# Patient Record
Sex: MALE | Race: BLACK OR AFRICAN AMERICAN | NOT HISPANIC OR LATINO | URBAN - METROPOLITAN AREA
[De-identification: names, ages, dates, MRNs, and addresses within clinical notes are randomized per-mention and may not be internally consistent; named-entity substitution may affect disease eponyms.]

---

## 2018-01-22 ENCOUNTER — EMERGENCY (EMERGENCY)
Facility: HOSPITAL | Age: 48
LOS: 1 days | Discharge: HOME | End: 2018-01-22
Admitting: EMERGENCY MEDICINE
Payer: COMMERCIAL

## 2018-01-22 VITALS
TEMPERATURE: 98 F | OXYGEN SATURATION: 98 % | HEART RATE: 79 BPM | DIASTOLIC BLOOD PRESSURE: 81 MMHG | RESPIRATION RATE: 16 BRPM | SYSTOLIC BLOOD PRESSURE: 138 MMHG
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
## 2018-01-22 DIAGNOSIS — F17.200 NICOTINE DEPENDENCE, UNSPECIFIED, UNCOMPLICATED: ICD-10-CM
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
## 2018-01-22 DIAGNOSIS — S20.211A CONTUSION OF RIGHT FRONT WALL OF THORAX, INITIAL ENCOUNTER: ICD-10-CM
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
## 2018-01-22 DIAGNOSIS — R07.89 OTHER CHEST PAIN: ICD-10-CM

## 2018-01-22 PROCEDURE — 71250 CT THORAX DX C-: CPT | Mod: 26

## 2018-01-22 PROCEDURE — 73000 X-RAY EXAM OF COLLAR BONE: CPT | Mod: 26,RT

## 2018-01-22 PROCEDURE — 99285 EMERGENCY DEPT VISIT HI MDM: CPT | Mod: 25

## 2018-01-22 PROCEDURE — 71046 X-RAY EXAM CHEST 2 VIEWS: CPT | Mod: 26

## 2018-01-22 PROCEDURE — 93010 ELECTROCARDIOGRAM REPORT: CPT | Mod: NC

## 2018-01-22 PROCEDURE — 71046 X-RAY EXAM CHEST 2 VIEWS: CPT | Mod: NC

## 2018-01-22 RX ORDER — TRAMADOL HYDROCHLORIDE 50 MG/1
50 TABLET ORAL ONCE
Qty: 0 | Refills: 0 | Status: DISCONTINUED | OUTPATIENT
Start: 2018-01-22 | End: 2018-01-22

## 2018-01-22 RX ORDER — IBUPROFEN 200 MG
600 TABLET ORAL ONCE
Qty: 0 | Refills: 0 | Status: COMPLETED | OUTPATIENT
Start: 2018-01-22 | End: 2018-01-22

## 2018-01-22 RX ORDER — IBUPROFEN 200 MG
1 TABLET ORAL
Qty: 20 | Refills: 0 | OUTPATIENT
Start: 2018-01-22 | End: 2018-02-20

## 2018-01-22 RX ADMIN — Medication 600 MILLIGRAM(S): at 12:54

## 2018-01-22 RX ADMIN — TRAMADOL HYDROCHLORIDE 50 MILLIGRAM(S): 50 TABLET ORAL at 12:54

## 2018-01-22 NOTE — ED PROVIDER NOTE - DIAGNOSTIC INTERPRETATION
Xray (wet reads) interpreted by CHUY CHACKO   Xray chest/clavicle - Cardiac silhouette, aortic knob, mediastinal and hilar contours appear wnl, no acute consolidation, infiltrate, effusion, or PTX. No bony abnormalities noted. Xray (wet reads) interpreted by CHUY CHACKO   Xray chest/clavicle - Cardiac silhouette, aortic knob, mediastinal and hilar contours appear wnl, no acute consolidation, infiltrate, effusion, or PTX, ?T2-3 R sided fx, no subluxation noted

## 2018-01-22 NOTE — ED ADULT TRIAGE NOTE - CHIEF COMPLAINT QUOTE
states a chest metal pipe fell on chest at work today states a metal pipe fell on chest at work today, red area noted upper middle chest

## 2018-01-22 NOTE — ED ADULT NURSE NOTE - OBJECTIVE STATEMENT
c/p chest "soreness" s/p metal pipe falling on him at work, denies sob, redness noted, no s/s of distress, awaiting MD barcenas, will continue to monitor

## 2018-01-22 NOTE — ED PROVIDER NOTE - PHYSICAL EXAMINATION
Gen - WDWN M, NAD, comfortable in stretcher, non-toxic appearing, speaking in full sentences   Skin - warm, dry, intact  HEENT - AT/NC, PERRL, EOMI, no conjunctival injection, moist oral mucosa, o/p clear with no erythema, edema, or exudate, uvula midline, airway patent, neck supple and NT, FROM  CV - S1S2, R/R/R  Resp - respiration non-labored, CTAB, symmetric bs b/l, no r/r/w  GI - NABS, soft, ND, NT, no rebound or guarding, no CVAT b/l   MS - w/w/p, R anterior chest wall (T2-3 region) and proximal clavicle TTP with mild edema, no ecchymosis, crepitus or deformity   Neuro - AxOx3, no focal neuro deficits, ambulatory without gait disturbance

## 2018-01-22 NOTE — ED PROVIDER NOTE - OBJECTIVE STATEMENT
47 yo M with no known PMHx, presenting c/o R clavicle and chest wall pain s/p work injury. 49 yo M with no known PMHx, presenting c/o R clavicle and chest wall pain s/p work injury. Pt states that a metal pipe fell on his chest wall at work from approximately 10 ft height and sustained injury to the region with pain and swelling subsequently.  Denies head trauma, LOC, bleeding, HA, dizziness, SOB, CP, palpitations, N/V, change in ROM/sensation, abdominal/back/neck pain, focal weakness, change in vision/mental status/speech, paresthesia, and malaise. Not on any AC/NSAIDs

## 2018-07-26 ENCOUNTER — APPOINTMENT (OUTPATIENT)
Dept: UROLOGY | Facility: CLINIC | Age: 48
End: 2018-07-26
Payer: COMMERCIAL

## 2018-07-26 PROBLEM — Z00.00 ENCOUNTER FOR PREVENTIVE HEALTH EXAMINATION: Status: ACTIVE | Noted: 2018-07-26

## 2018-07-26 PROCEDURE — 99204 OFFICE O/P NEW MOD 45 MIN: CPT

## 2018-07-30 LAB
AFP-TM SERPL-MCNC: 4.8 NG/ML
HCG-TM SERPL-MCNC: <1 MIU/ML
LDH SERPL-CCNC: 189 U/L
LH SERPL-ACNC: <0.1
TESTOST SERPL-MCNC: 296.1 NG/DL

## 2018-08-01 ENCOUNTER — APPOINTMENT (OUTPATIENT)
Dept: UROLOGY | Facility: CLINIC | Age: 48
End: 2018-08-01
Payer: COMMERCIAL

## 2018-08-01 DIAGNOSIS — Z78.9 OTHER SPECIFIED HEALTH STATUS: ICD-10-CM

## 2018-08-01 DIAGNOSIS — Z80.0 FAMILY HISTORY OF MALIGNANT NEOPLASM OF DIGESTIVE ORGANS: ICD-10-CM

## 2018-08-01 DIAGNOSIS — Z80.43 FAMILY HISTORY OF MALIGNANT NEOPLASM OF TESTIS: ICD-10-CM

## 2018-08-01 PROCEDURE — 99214 OFFICE O/P EST MOD 30 MIN: CPT

## 2018-08-11 ENCOUNTER — FORM ENCOUNTER (OUTPATIENT)
Age: 48
End: 2018-08-11

## 2018-08-12 ENCOUNTER — OUTPATIENT (OUTPATIENT)
Dept: OUTPATIENT SERVICES | Facility: HOSPITAL | Age: 48
LOS: 1 days | End: 2018-08-12
Payer: COMMERCIAL

## 2018-08-12 ENCOUNTER — APPOINTMENT (OUTPATIENT)
Dept: CT IMAGING | Facility: IMAGING CENTER | Age: 48
End: 2018-08-12
Payer: COMMERCIAL

## 2018-08-12 DIAGNOSIS — N50.9 DISORDER OF MALE GENITAL ORGANS, UNSPECIFIED: ICD-10-CM

## 2018-08-12 PROCEDURE — 71260 CT THORAX DX C+: CPT | Mod: 26

## 2018-08-12 PROCEDURE — 74160 CT ABDOMEN W/CONTRAST: CPT

## 2018-08-12 PROCEDURE — 71260 CT THORAX DX C+: CPT

## 2018-08-12 PROCEDURE — 74160 CT ABDOMEN W/CONTRAST: CPT | Mod: 26

## 2018-08-13 ENCOUNTER — OUTPATIENT (OUTPATIENT)
Dept: OUTPATIENT SERVICES | Facility: HOSPITAL | Age: 48
LOS: 1 days | End: 2018-08-13

## 2018-08-13 VITALS
WEIGHT: 238.1 LBS | RESPIRATION RATE: 16 BRPM | TEMPERATURE: 98 F | SYSTOLIC BLOOD PRESSURE: 130 MMHG | HEART RATE: 68 BPM | HEIGHT: 74 IN | DIASTOLIC BLOOD PRESSURE: 88 MMHG

## 2018-08-13 DIAGNOSIS — N50.9 DISORDER OF MALE GENITAL ORGANS, UNSPECIFIED: ICD-10-CM

## 2018-08-13 DIAGNOSIS — Z98.890 OTHER SPECIFIED POSTPROCEDURAL STATES: Chronic | ICD-10-CM

## 2018-08-13 LAB
BUN SERPL-MCNC: 12 MG/DL — SIGNIFICANT CHANGE UP (ref 7–23)
CALCIUM SERPL-MCNC: 8.9 MG/DL — SIGNIFICANT CHANGE UP (ref 8.4–10.5)
CHLORIDE SERPL-SCNC: 101 MMOL/L — SIGNIFICANT CHANGE UP (ref 98–107)
CO2 SERPL-SCNC: 28 MMOL/L — SIGNIFICANT CHANGE UP (ref 22–31)
CREAT SERPL-MCNC: 1.64 MG/DL — HIGH (ref 0.5–1.3)
GLUCOSE SERPL-MCNC: 74 MG/DL — SIGNIFICANT CHANGE UP (ref 70–99)
HCT VFR BLD CALC: 46.9 % — SIGNIFICANT CHANGE UP (ref 39–50)
HGB BLD-MCNC: 16.1 G/DL — SIGNIFICANT CHANGE UP (ref 13–17)
MCHC RBC-ENTMCNC: 28.4 PG — SIGNIFICANT CHANGE UP (ref 27–34)
MCHC RBC-ENTMCNC: 34.3 % — SIGNIFICANT CHANGE UP (ref 32–36)
MCV RBC AUTO: 82.7 FL — SIGNIFICANT CHANGE UP (ref 80–100)
NRBC # FLD: 0 — SIGNIFICANT CHANGE UP
PLATELET # BLD AUTO: 217 K/UL — SIGNIFICANT CHANGE UP (ref 150–400)
PMV BLD: 10.6 FL — SIGNIFICANT CHANGE UP (ref 7–13)
POTASSIUM SERPL-MCNC: 3.9 MMOL/L — SIGNIFICANT CHANGE UP (ref 3.5–5.3)
POTASSIUM SERPL-SCNC: 3.9 MMOL/L — SIGNIFICANT CHANGE UP (ref 3.5–5.3)
RBC # BLD: 5.67 M/UL — SIGNIFICANT CHANGE UP (ref 4.2–5.8)
RBC # FLD: 15.2 % — HIGH (ref 10.3–14.5)
SODIUM SERPL-SCNC: 140 MMOL/L — SIGNIFICANT CHANGE UP (ref 135–145)
WBC # BLD: 6.21 K/UL — SIGNIFICANT CHANGE UP (ref 3.8–10.5)
WBC # FLD AUTO: 6.21 K/UL — SIGNIFICANT CHANGE UP (ref 3.8–10.5)

## 2018-08-13 NOTE — H&P PST ADULT - NSANTHOSAYNRD_GEN_A_CORE
No. JOANA screening performed.  STOP BANG Legend: 0-2 = LOW Risk; 3-4 = INTERMEDIATE Risk; 5-8 = HIGH Risk

## 2018-08-13 NOTE — H&P PST ADULT - NEGATIVE MUSCULOSKELETAL SYMPTOMS
no back pain/no leg pain L/no arthritis/no stiffness/no neck pain/no arm pain L/no arm pain R/no leg pain R

## 2018-08-13 NOTE — H&P PST ADULT - HISTORY OF PRESENT ILLNESS
47 y/o male presents to [facility] for scheduled for bilateral  inguinal exploration u/s guided excisional biopsy and b/l testicular prosthesis and possible radical orchidectomy on  8/16/18. Patient reports feeling discomfort to testicles last month seen by urologist.

## 2018-08-13 NOTE — H&P PST ADULT - ASSESSMENT
47 y/o male presents to [facility] for scheduled for bilateral  inguinal exploration u/s guided excisional biopsy and b/l testicular prosthesis and possible radical orchidectomy on  8/16/18.

## 2018-08-13 NOTE — H&P PST ADULT - ATTENDING COMMENTS
I have had extensive discussions regarding the planned surgery.  He understands that he has multiple masses in both testicles  He understands that ultrasound guidance biopsies will be carried out with frozen sections.  If clear margins are achieved he wants to attempt to preserve the right testicle  He is aware that multiple lesion of left testicle preclude preservation.  He also is aware final pathology may not reflect frozen and additional procedures may be required  He understands the need for life long T replacement  He wishes to undergo testicular prostheses placement.  CT and tumor markers reviewed

## 2018-08-15 PROBLEM — N50.9 DISORDER OF MALE GENITAL ORGANS, UNSPECIFIED: Chronic | Status: ACTIVE | Noted: 2018-08-13

## 2018-08-16 ENCOUNTER — APPOINTMENT (OUTPATIENT)
Dept: UROLOGY | Facility: AMBULATORY SURGERY CENTER | Age: 48
End: 2018-08-16

## 2018-08-16 ENCOUNTER — RESULT REVIEW (OUTPATIENT)
Age: 48
End: 2018-08-16

## 2018-08-16 ENCOUNTER — OTHER (OUTPATIENT)
Age: 48
End: 2018-08-16

## 2018-08-16 ENCOUNTER — OUTPATIENT (OUTPATIENT)
Dept: OUTPATIENT SERVICES | Facility: HOSPITAL | Age: 48
LOS: 1 days | Discharge: HOME | End: 2018-08-16
Payer: COMMERCIAL

## 2018-08-16 VITALS
SYSTOLIC BLOOD PRESSURE: 146 MMHG | HEIGHT: 74 IN | RESPIRATION RATE: 16 BRPM | WEIGHT: 238.1 LBS | TEMPERATURE: 98 F | HEART RATE: 69 BPM | OXYGEN SATURATION: 98 % | DIASTOLIC BLOOD PRESSURE: 90 MMHG

## 2018-08-16 VITALS
DIASTOLIC BLOOD PRESSURE: 74 MMHG | HEART RATE: 66 BPM | RESPIRATION RATE: 15 BRPM | SYSTOLIC BLOOD PRESSURE: 146 MMHG | OXYGEN SATURATION: 100 %

## 2018-08-16 DIAGNOSIS — N50.9 DISORDER OF MALE GENITAL ORGANS, UNSPECIFIED: ICD-10-CM

## 2018-08-16 DIAGNOSIS — Z98.890 OTHER SPECIFIED POSTPROCEDURAL STATES: Chronic | ICD-10-CM

## 2018-08-16 PROCEDURE — 54530 REMOVAL OF TESTIS: CPT | Mod: 50

## 2018-08-16 PROCEDURE — 88331 PATH CONSLTJ SURG 1 BLK 1SPC: CPT | Mod: 26

## 2018-08-16 PROCEDURE — 88305 TISSUE EXAM BY PATHOLOGIST: CPT | Mod: 26

## 2018-08-16 PROCEDURE — 11420 EXC H-F-NK-SP B9+MARG 0.5/<: CPT

## 2018-08-16 NOTE — ASU DISCHARGE PLAN (ADULT/PEDIATRIC). - MEDICATION SUMMARY - MEDICATIONS TO TAKE
I will START or STAY ON the medications listed below when I get home from the hospital:    Percocet 5/325 oral tablet  -- 1 tab(s) by mouth every 4 to 6 hours prn pain MDD 6 doses  -- Caution federal law prohibits the transfer of this drug to any person other  than the person for whom it was prescribed.  May cause drowsiness.  Alcohol may intensify this effect.  Use care when operating dangerous machinery.  This prescription cannot be refilled.  This product contains acetaminophen.  Do not use  with any other product containing acetaminophen to prevent possible liver damage.  Using more of this medication than prescribed may cause serious breathing problems.    -- Indication: For pain medication

## 2018-08-16 NOTE — ASU PREOP CHECKLIST - SURGICAL CONSENT
bilateral inguinal exploration , biopsy possible radical orchiectomy bilateral inguinal exploration , biopsy possible radical orchiectomy/done

## 2018-08-16 NOTE — ASU DISCHARGE PLAN (ADULT/PEDIATRIC). - ITEMS TO FOLLOWUP WITH YOUR PHYSICIAN'S
Apply ice pack to the scrotum. Keep the scrotal support until follow up to help prevent swelling. Follow up with Dr. Amanda Apply ice pack to the scrotum. Keep the scrotal support on until follow up to help prevent swelling. Follow up with Dr. Amanda

## 2018-08-16 NOTE — BRIEF OPERATIVE NOTE - PROCEDURE
<<-----Click on this checkbox to enter Procedure Inguinal exploration with biopsy of testis  08/16/2018  Bilateral with ultrasound guidance  Active  [id number]

## 2018-08-17 ENCOUNTER — OTHER (OUTPATIENT)
Age: 48
End: 2018-08-17

## 2018-08-19 ENCOUNTER — MOBILE ON CALL (OUTPATIENT)
Age: 48
End: 2018-08-19

## 2018-08-20 ENCOUNTER — OTHER (OUTPATIENT)
Age: 48
End: 2018-08-20

## 2018-08-21 ENCOUNTER — APPOINTMENT (OUTPATIENT)
Dept: UROLOGY | Facility: AMBULATORY SURGERY CENTER | Age: 48
End: 2018-08-21

## 2018-08-28 ENCOUNTER — OTHER (OUTPATIENT)
Age: 48
End: 2018-08-28

## 2018-09-06 ENCOUNTER — APPOINTMENT (OUTPATIENT)
Dept: UROLOGY | Facility: CLINIC | Age: 48
End: 2018-09-06
Payer: COMMERCIAL

## 2018-09-06 VITALS
TEMPERATURE: 98.2 F | OXYGEN SATURATION: 97 % | HEART RATE: 64 BPM | DIASTOLIC BLOOD PRESSURE: 87 MMHG | SYSTOLIC BLOOD PRESSURE: 135 MMHG

## 2018-09-06 DIAGNOSIS — N50.9 DISORDER OF MALE GENITAL ORGANS, UNSPECIFIED: ICD-10-CM

## 2018-09-06 DIAGNOSIS — D86.89 SARCOIDOSIS OF OTHER SITES: ICD-10-CM

## 2018-09-06 PROCEDURE — 99024 POSTOP FOLLOW-UP VISIT: CPT

## 2018-09-17 ENCOUNTER — APPOINTMENT (OUTPATIENT)
Dept: RHEUMATOLOGY | Facility: CLINIC | Age: 48
End: 2018-09-17

## 2020-10-01 NOTE — H&P PST ADULT - PROBLEM SELECTOR PLAN 1
Patient scheduled for bilateral  inguinal exploration u/s guided excisional biopsy and b/l testicular prosthesis and possible radical orchidectomy on  8/16/18.   Pre op  instructions given and explained.  Avoid NSAIDs and OTC supplements.   Patient verbalized understanding  initial b/p 130/88 repeat 130/82
No

## 2022-03-10 NOTE — BRIEF OPERATIVE NOTE - PRE-OP
[address]  [address], [address]  [address]  [id number]  2007    UPPER ENDOSCOPY DISCHARGE INSTRUCTIONS  Discomfort:  Redness at IV site- apply warm compress to area; if redness or soreness persist- contact your physician  There may be a slight amount of blood if there is vomiting      DIET:  Regular diet    MEDICATIONS:    Resume home medications     ACTIVITY:  Responsible adult should stay with child today. You may resume your normal daily activities it is recommended that you spend the remainder of the day resting -  avoid any strenuous activity. No driving for 24 hours    CALL M.D. ANY SIGN OF:   Increasing pain, nausea, vomiting  Abdominal distension (swelling)  Significant blood in vomit or bilious vomiting or several episodes of vomiting   Fever (chills)       Follow-up Instructions:  Call [facility] if any questions or problems. Telephone # [phone]      [address] [facility]  [address], [address]  [address]  [id number]  2007    COLON DISCHARGE INSTRUCTIONS  Discomfort:  Redness at IV site- apply warm compress to area; if redness or soreness persist- contact your physician  There may be a slight amount of blood passed from the rectum  Gaseous discomfort- walking, belching will help relieve any discomfort    DIET:  regular  remember your colon is empty and a heavy meal will produce gas. Avoid these foods:  vegetables, fried / greasy foods, carbonated drinks for today    MEDICATIONS:    Resume home medications     ACTIVITY:  Responsible adult should stay with child today. You may resume your normal daily activities it is recommended that you spend the remainder of the day resting -  avoid any strenuous activity. CALL M.D.   ANY SIGN OF:   Increasing pain, nausea, vomiting  Abdominal distension (swelling)  Significant rectal bleeding  Fever (chills)       Follow-up Instructions:  Call [facility] if any questions or problems. Telephone # [phone]                  Learning About Coronavirus [phone])  Coronavirus [phone]): Overview  What is coronavirus ([id number])? The coronavirus disease (COVID-19) is caused by a virus. It is an illness that was first found in [place], [place], in December 2019. It has since spread worldwide. The virus can cause fever, cough, and trouble breathing. In severe cases, it can cause pneumonia and make it hard to breathe without help. It can cause death. Coronaviruses are a large group of viruses. They cause the common cold. They also cause more serious illnesses like Middle East respiratory syndrome (MERS) and severe acute respiratory syndrome (SARS). COVID-19 is caused by a novel coronavirus. That means it's a new type that has not been seen in people before. This virus spreads person-to-person through droplets from coughing and sneezing. It can also spread when you are close to someone who is infected. And it can spread when you touch something that has the virus on it, such as a doorknob or a tabletop. What can you do to protect yourself from coronavirus (COVID-19)? The best way to protect yourself from getting sick is to:  · Avoid areas where there is an outbreak. · Avoid contact with people who may be infected. · Wash your hands often with soap or alcohol-based hand sanitizers. · Avoid crowds and try to stay at least 6 feet away from other people. · Wash your hands often, especially after you cough or sneeze. Use soap and water, and scrub for at least 20 seconds. If soap and water aren't available, use an alcohol-based hand . · Avoid touching your mouth, nose, and eyes. What can you do to avoid spreading the virus to others? To help avoid spreading the virus to others:  · Cover your mouth with a tissue when you cough or sneeze.  Then throw the tissue in the trash. · Use a disinfectant to clean things that you touch often. · Stay home if you are sick or have been exposed to the virus. Don't go to school, work, or public areas. And don't use public transportation. · If you are sick:  ? Leave your home only if you need to get medical care. But call the doctor's office first so they know you're coming. And wear a face mask, if you have one.  ? If you have a face mask, wear it whenever you're around other people. It can help stop the spread of the virus when you cough or sneeze. ? Clean and disinfect your home every day. Use household  and disinfectant wipes or sprays. Take special care to clean things that you grab with your hands. These include doorknobs, remote controls, phones, and handles on your refrigerator and microwave. And don't forget countertops, tabletops, bathrooms, and computer keyboards. When to call for help  Call 911 anytime you think you may need emergency care. For example, call if:  · You have severe trouble breathing. (You can't talk at all.)  · You have constant chest pain or pressure. · You are severely dizzy or lightheaded. · You are confused or can't think clearly. · Your face and lips have a blue color. · You pass out (lose consciousness) or are very hard to wake up. Call your doctor now if you develop symptoms such as:  · Shortness of breath. · Fever. · Cough. If you need to get care, call ahead to the doctor's office for instructions before you go. Make sure you wear a face mask, if you have one, to prevent exposing other people to the virus. Where can you get the latest information? The following health organizations are tracking and studying this virus. Their websites contain the most up-to-date information. Harlen Severin also learn what to do if you think you may have been exposed to the virus. · U.S. Centers for Disease Control and Prevention (CDC): The CDC provides updated news about the disease and travel advice.  The <<-----Click on this checkbox to enter Pre-Op Dx website also tells you how to prevent the spread of infection. www.cdc.gov  · World Health Organization [facility]): WHO offers information about the virus outbreaks. WHO also has travel advice. www.who.int  Current as of: April 1, 2020               Content Version: 12.4  © 7108-8934 [employer]. Care instructions adapted under license by your healthcare professional. If you have questions about a medical condition or this instruction, always ask your healthcare professional. [address] any warranty or liability for your use of this information.

## 2024-03-29 NOTE — ASU DISCHARGE PLAN (ADULT/PEDIATRIC). - DRIVING
Central Prior Authorization Team  Phone: [phone]    PRIOR AUTHORIZATION DENIED    Medication: UBRELVY 100 MG PO TABS  Insurance Company: [employer] - Phone [phone] Fax [phone]  Denial Date: 3/28/2024  Denial Reason(s):         Appeal Information:         Patient Notified: no    
Central Prior Authorization Team  Phone: [phone]    PA Initiation    Medication: UBRELVY 100 MG PO TABS  Insurance Company: [employer] - Phone [phone] Fax [phone]  Pharmacy Filling the Rx: [id number] - [place], [place] - [address]  Filling Pharmacy Phone:    Filling Pharmacy Fax:    Start Date: 3/28/2024      
Prior Authorization Retail Medication Request    Medication/Dose: ubrogepant (UBRELVY) 100 MG tablet  Diagnosis and ICD code (if different than what is on RX):    New/renewal/insurance change PA/secondary ins. PA:  Previously Tried and Failed:    Sumatriptan  Nurtec  Compazine  Naproxen  Tylenol  Rizatriptan    Rationale:  migraine    Insurance   Primary: [employer]  Insurance ID:  [id number]    Pharmacy Information (if different than what is on RX)  Name:    Phone:    Fax:    
Updated via [employer]. Nurtec PA request sent. Resources provided to patient for financial assistance programs.   
No

## 2025-03-01 NOTE — H&P PST ADULT - DENTITION
This was a shared visit with the YANE. I reviewed and verified the documentation. denies loose teeth or dentures